# Patient Record
(demographics unavailable — no encounter records)

---

## 2025-06-28 NOTE — PHYSICAL EXAM
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

## 2025-06-28 NOTE — ASSESSMENT
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

## 2025-06-28 NOTE — HISTORY OF PRESENT ILLNESS
Other belongings
[FreeTextEntry1] : Mr. MIKE MCDONALD is a 12 year male who is seen in the office for Left knee pain with Mohsen's condition. Patient denies any current or recent fever, chills, nausea, vomiting, SOB, or chest pain. AAOx3 and in NAD.  Patient mentions pain started on 6/17/25, no hx of trauma, no bruising, also mentions taking children's ibuprofen for the pain

## 2025-06-28 NOTE — REVIEW OF SYSTEMS
[Arthralgias] : no arthralgias [Joint Swelling] : no joint swelling [Joint Stiffness] : no joint stiffness [Limb Pain] : limb pain [Limb Swelling] : no limb swelling [Skin Lesions] : no skin lesions [Skin Wound] : no skin wound [Itching] : no itching [Dry Skin] : no dry skin [Negative] : Psychiatric

## 2025-07-14 NOTE — HISTORY OF PRESENT ILLNESS
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   The patient previously saw his podiatrist on 06/27/2025 for concerns regarding left knee pain  for few months . The patient was advised to seek consultation with a peds ortho office for further evaluation of his left knee  for concerns of  Nance's Disease. The patient is here for the above. Today in the office, the patient reports pain located on his left knee while walking. He denies experiencing pain on his right knee. No recent illness, no nausea/vomiting, no fevers/chills. The patient and his mother deny undergoing any X Rays for his left leg prior to today's visit.

## 2025-07-14 NOTE — END OF VISIT
[Time Spent: ___ minutes] : I have spent [unfilled] minutes of time on the encounter which excludes teaching and separately reported services. [FreeTextEntry3] : I, Aris Fulton MD, I personally performed the services described in the documentation, reviewed the documentation recorded by the scribe in my presence and it accurately and completely records my words and actions

## 2025-07-14 NOTE — DATA REVIEWED
[de-identified] : X Rays leg length AP   done at [facility] on 07/14/2025 were viewed and independently interpreted:  Normal alignment of the RLE LLE in varus d/t [place]  disease MPTA: 67 . LDFA 90 Proximal tibia growth plate are opened  and his skeletal age is: 13

## 2025-07-14 NOTE — ASSESSMENT
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 Xray was reviewed today demonstrating LLE in varus d/t Mohsen  disease MPTA: 67 . 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 The deformity occurs when the lateral (outer) side of the tibia continues growing while the medial (or inner) side of the bone does not. Blounts disease may affect one or both legs. Clinical exam and imaging discussed at length with parents. The natural history of above diagnosis discussed. He will require surgical intervention to correct the deformity. Discussed with parents that if they wish to proceed with hemiepiphysiodesis of the left lateral proximal tibia with H plate , he will need to schedule it as early as possible.  we discussed that the deformity is big and just guided growth may not be sufficient to correct the deformity and he may need to have future osteotomy I explained family  surgical procedure, alternative treatment options, possible complication, post operative management. This plan was discussed with family. Family verbalizes understanding and agreement of plan. All questions and concerns were addressed today.

## 2025-07-14 NOTE — REASON FOR VISIT
[Initial Evaluation] : an initial evaluation [Patient] : patient [Mother] : mother [FreeTextEntry1] : Left Collier's Disease

## 2025-07-14 NOTE — PHYSICAL EXAM
[FreeTextEntry1] :  General: Patient is awake and alert and in no acute distress. oriented to person, place. well developed, well nourished, cooperative.   Skin: The skin is intact, warm, pink, and dry over the area examined.   Eyes: normal conjunctiva, normal eyelids and pupils were equal and round.   ENT: normal ears, normal nose and normal lips.   Cardiovascular: There is brisk capillary refill in the digits of the affected extremity. They are symmetric pulses in the bilateral upper and lower extremities, positive peripheral pulses, brisk capillary refill, but no peripheral edema.   Respiratory: The patient is in no apparent respiratory distress. They're taking full deep breaths without use of accessory muscles or evidence of audible wheezes or stridor without the use of a stethoscope, normal respiratory effort.   Neurological: 5/5 motor strength in the main muscle groups of bilateral lower extremities, sensory intact in bilateral lower extremities.   Musculoskeletal: Ambulate with normal gait, LLE with IR. When stand hi RLE in a normal alignment but the LLE is in varus around the knee area both knee with no swelling. full ROM. STABLE With negative Lachman. no meniscal sign. no bony tenderness. pain mostly with patellar grind test. NV intact